# Patient Record
Sex: MALE | Race: WHITE | NOT HISPANIC OR LATINO | Employment: FULL TIME | URBAN - METROPOLITAN AREA
[De-identification: names, ages, dates, MRNs, and addresses within clinical notes are randomized per-mention and may not be internally consistent; named-entity substitution may affect disease eponyms.]

---

## 2024-09-23 ENCOUNTER — APPOINTMENT (OUTPATIENT)
Dept: OTOLARYNGOLOGY | Facility: CLINIC | Age: 79
End: 2024-09-23
Payer: COMMERCIAL

## 2024-09-23 DIAGNOSIS — R49.0 HOARSENESS: Primary | ICD-10-CM

## 2024-09-23 DIAGNOSIS — J34.89 NASAL SEPTAL PERFORATION: ICD-10-CM

## 2024-09-23 DIAGNOSIS — J38.7 PRESBYLARYNX: ICD-10-CM

## 2024-09-23 PROCEDURE — 1159F MED LIST DOCD IN RCRD: CPT | Performed by: OTOLARYNGOLOGY

## 2024-09-23 PROCEDURE — 1157F ADVNC CARE PLAN IN RCRD: CPT | Performed by: OTOLARYNGOLOGY

## 2024-09-23 PROCEDURE — 31575 DIAGNOSTIC LARYNGOSCOPY: CPT | Performed by: OTOLARYNGOLOGY

## 2024-09-23 PROCEDURE — 1036F TOBACCO NON-USER: CPT | Performed by: OTOLARYNGOLOGY

## 2024-09-23 PROCEDURE — 99203 OFFICE O/P NEW LOW 30 MIN: CPT | Performed by: OTOLARYNGOLOGY

## 2024-09-23 RX ORDER — ASPIRIN 81 MG/1
TABLET ORAL EVERY 24 HOURS
COMMUNITY

## 2024-09-23 RX ORDER — METOPROLOL SUCCINATE 100 MG/1
TABLET, EXTENDED RELEASE ORAL EVERY 24 HOURS
COMMUNITY

## 2024-09-23 RX ORDER — POTASSIUM CHLORIDE 750 MG/1
2 TABLET, EXTENDED RELEASE ORAL DAILY
COMMUNITY
Start: 2023-10-24

## 2024-09-23 RX ORDER — COLCHICINE 0.6 MG/1
1 TABLET ORAL 2 TIMES DAILY
COMMUNITY
Start: 2015-08-22

## 2024-09-23 RX ORDER — ROSUVASTATIN CALCIUM 10 MG/1
1 TABLET, COATED ORAL NIGHTLY
COMMUNITY
Start: 2015-06-19

## 2024-09-23 RX ORDER — CHLORTHALIDONE 25 MG/1
25 TABLET ORAL
COMMUNITY
Start: 2023-01-03

## 2024-09-23 NOTE — PROGRESS NOTES
Chief Complaint   Patient presents with    New Patient Visit     NP- COUGHING AND HOARSENESS      HPI:  Heath Holguin is a 78 y.o. male he complains about a 2-month history of some raspy hoarse voice.  Notices it more at work.  No sore throat, dysphagia, globus, throat clearing, coughing.  Initially he did have some coughing but this has resolved.  Just wanted checked out to make sure nothing significant going on.  History of septoplasty in the past.    PMH:  History reviewed. No pertinent past medical history.  Past Surgical History:   Procedure Laterality Date    GASTRIC BYPASS      TOTAL KNEE ARTHROPLASTY Right          Medications:     Current Outpatient Medications:     aspirin 81 mg EC tablet, once every 24 hours., Disp: , Rfl:     chlorthalidone (Hygroton) 25 mg tablet, Take 1 tablet (25 mg) by mouth once daily., Disp: , Rfl:     colchicine 0.6 mg tablet, Take 1 tablet (0.6 mg) by mouth 2 times a day., Disp: , Rfl:     metoprolol succinate XL (Toprol-XL) 100 mg 24 hr tablet, once every 24 hours., Disp: , Rfl:     potassium chloride CR 10 mEq ER tablet, Take 2 tablets (20 mEq) by mouth once daily., Disp: , Rfl:     rivaroxaban (Xarelto) 20 mg tablet, Take 1 tablet (20 mg) by mouth once daily., Disp: , Rfl:     rosuvastatin (Crestor) 10 mg tablet, Take 1 tablet (10 mg) by mouth once daily at bedtime., Disp: , Rfl:      Allergies:  No Known Allergies     ROS:  Review of systems normal unless stated otherwise in the HPI and/or PMH.    Physical Exam:  There were no vitals taken for this visit. There is no height or weight on file to calculate BMI.     GENERAL APPEARANCE: Well developed and well nourished.  Alert and oriented in no acute distress.  Normal vocal quality.  Mildly raspy.  Not breathy.    HEAD/FACE: No erythema or edema or facial tenderness.  Normal facial nerve function bilaterally.    EAR:       EXTERNAL: Normal pinnas and external auditory canals without lesion or obstructing wax.       MIDDLE EAR:  Tympanic membranes intact and mobile with normal landmarks.  Middle ear space appears well aerated.       TUBE STATUS: N/A       MASTOID CAVITY: N/A       HEARING: Gross hearing assessment is within normal limits.      NOSE:       VISUALIZED USING: Anterior rhinoscopy with headlight and nasal speculum.       DORSUM: Midline, nontraumatic appearance.       MUCOSA: Normal-appearing.       SECRETIONS: Normal.       SEPTUM: Midline and nonobstructing.  Small mid perforation       INFERIOR TURBINATES: Normal.       MIDDLE TURBINATES/MEATUS: N/A       BLEEDING: N/A         ORAL CAVITY/PHARYNX:       TEETH: Adequate dentition.       TONGUE: No mass or lesion.  Normal mobility.       FLOOR OF MOUTH: No mass or lesion.       PALATE: Normal hard palate, soft palate, and uvula.       OROPHARYNX: Normal without mass or lesion.       BUCCAL MUCOSA/GBS: Normal without mass or lesion.       LIPS: Normal.    LARYNX/HYPOPHARYNX/NASOPHARYNX: Flexible laryngoscopy was performed after consent secondary to an inadequate mirror examination.  The flexible laryngoscope was placed through the nasal cavity revealing normal nasopharynx, normal oropharynx, normal hypopharynx, and normal larynx.  There is normal bilateral vocal cord mobility without any mucosal masses or lesions.  Bilateral cords are a little thin    NECK: No palpable masses or abnormal adenopathy.  Trachea is midline.    THYROID: No thyromegaly or palpable nodule.    SALIVARY GLANDS: Normal bilateral parotid and submandibular glands by inspection and palpation.    TMJ's: Normal.    NEURO: Cranial nerve exam grossly normal bilaterally.       Assessment/Plan   Heath was seen today for new patient visit.  Diagnoses and all orders for this visit:  Hoarseness (Primary)  Nasal septal perforation  Presbylarynx     Educated.  Likely age-related and use related to raspiness to his voice.  Recommend hydration and avoiding the extremes of talking such as whispering and yelling.  If  things get worse would consider speech therapy.  He is not interested at this time.  Follow up if symptoms worsen or fail to improve.     Benjamin Velez MD

## 2024-11-18 ENCOUNTER — APPOINTMENT (OUTPATIENT)
Dept: OTOLARYNGOLOGY | Facility: CLINIC | Age: 79
End: 2024-11-18
Payer: COMMERCIAL